# Patient Record
Sex: MALE | Race: WHITE | NOT HISPANIC OR LATINO | Employment: FULL TIME | URBAN - METROPOLITAN AREA
[De-identification: names, ages, dates, MRNs, and addresses within clinical notes are randomized per-mention and may not be internally consistent; named-entity substitution may affect disease eponyms.]

---

## 2017-05-17 ENCOUNTER — NON-PROVIDER VISIT (OUTPATIENT)
Dept: URGENT CARE | Facility: PHYSICIAN GROUP | Age: 47
End: 2017-05-17

## 2017-09-20 ENCOUNTER — OFFICE VISIT (OUTPATIENT)
Dept: CARDIOLOGY | Facility: MEDICAL CENTER | Age: 47
End: 2017-09-20
Payer: COMMERCIAL

## 2017-09-20 VITALS
HEIGHT: 70 IN | SYSTOLIC BLOOD PRESSURE: 130 MMHG | DIASTOLIC BLOOD PRESSURE: 82 MMHG | OXYGEN SATURATION: 97 % | WEIGHT: 186 LBS | BODY MASS INDEX: 26.63 KG/M2 | HEART RATE: 60 BPM

## 2017-09-20 DIAGNOSIS — R07.89 OTHER CHEST PAIN: ICD-10-CM

## 2017-09-20 DIAGNOSIS — Z95.5 STENTED CORONARY ARTERY: ICD-10-CM

## 2017-09-20 DIAGNOSIS — Z72.0 TOBACCO ABUSE: ICD-10-CM

## 2017-09-20 DIAGNOSIS — I25.10 CORONARY ARTERY DISEASE INVOLVING NATIVE CORONARY ARTERY OF NATIVE HEART WITHOUT ANGINA PECTORIS: ICD-10-CM

## 2017-09-20 PROCEDURE — 99214 OFFICE O/P EST MOD 30 MIN: CPT | Performed by: INTERNAL MEDICINE

## 2017-09-20 ASSESSMENT — ENCOUNTER SYMPTOMS
GASTROINTESTINAL NEGATIVE: 1
WEIGHT LOSS: 0
DOUBLE VISION: 0
DIZZINESS: 0
CLAUDICATION: 0
RESPIRATORY NEGATIVE: 1
SHORTNESS OF BREATH: 0
CONSTITUTIONAL NEGATIVE: 1
MUSCULOSKELETAL NEGATIVE: 1
NEUROLOGICAL NEGATIVE: 1
NAUSEA: 0
PALPITATIONS: 0
BLURRED VISION: 0
FOCAL WEAKNESS: 0
DEPRESSION: 0
ABDOMINAL PAIN: 0
FEVER: 0
EYES NEGATIVE: 1
HEADACHES: 0
VOMITING: 0
BRUISES/BLEEDS EASILY: 0
WEAKNESS: 0
NERVOUS/ANXIOUS: 1
COUGH: 0
CHILLS: 0
MYALGIAS: 0

## 2017-09-20 NOTE — PROGRESS NOTES
Subjective:   Justin Corbett is a 47 y.o. male who presents today for annual follow up of chest pain.    Since the patient's last visit on 01/13/15, he has continued to experience chest pain. He denies chest shortness of breath, palpitations, nausea/vomiting or diaphoresis. He has history of self described coronary artery disease with prior stent placements x 3 by Dr. Coleman at [facility] approximately a year ago, though, there are no records of this procedure. He moved to [place] for 2 and half year. He states he has suffered another myocardial infarction on 001/20/17 treated with another stent. 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    Past Medical History:   Diagnosis Date   • MI (myocardial infarction) (CMS-[facility]) 2014   • CAD (coronary artery disease)    • Stented coronary artery     3 stents     Past Surgical History:   Procedure Laterality Date   • ANGIOPLASTY       Family History   Problem Relation Age of Onset   • Heart Attack Father      History   Smoking Status   • Current Every Day Smoker   Smokeless Tobacco   • Never Used     Allergies   Allergen Reactions   • Pcn [Penicillins]      Medications reviewed.    Outpatient Encounter Prescriptions as of 9/20/2017   Medication Sig Dispense Refill   • NITROGLYCERIN SL Place  under tongue.     • [DISCONTINUED] lorazepam (ATIVAN) 1 MG TABS Take 1 Tab by mouth every 8 hours as needed for Anxiety (or muscle spasm). 20 Tab 0   • [DISCONTINUED] oxycodone-acetaminophen (PERCOCET) 5-325 MG TABS Take 1 Tab by mouth every four hours as needed. 15 Tab 0   • [DISCONTINUED] oxycodone-acetaminophen (PERCOCET) 7.5-325 MG per tablet Take 1 Tab by mouth every 6 hours as needed for Moderate Pain or Severe Pain. 40 Tab 0   • [DISCONTINUED] lorazepam (ATIVAN) 1 MG TABS Take 1 Tab by mouth every 6 hours as needed for Anxiety. 40 Tab 0   • [DISCONTINUED] metoprolol SR (TOPROL XL) 25 MG TB24 Take 0.5 Tabs by mouth every day. 30 Tab 11   • [DISCONTINUED] senna-docusate (PERICOLACE OR  "SENOKOT S) 8.6-50 MG TABS Take 1 Tab by mouth every day. 30 Tab 0   • [DISCONTINUED] clopidogrel (PLAVIX) 75 MG TABS Take 1 Tab by mouth every day. 30 Tab 11   • [DISCONTINUED] lisinopril (PRINIVIL) 10 MG TABS Take 1 Tab by mouth every day. 30 Tab 11   • [DISCONTINUED] aspirin (ASA) 81 MG CHEW chewable tablet Take 1 Tab by mouth every day. 100 Tab 2     No facility-administered encounter medications on file as of 9/20/2017.      Review of Systems   Constitutional: Negative.  Negative for chills, fever, malaise/fatigue and weight loss.   HENT: Negative.  Negative for hearing loss.    Eyes: Negative.  Negative for blurred vision and double vision.   Respiratory: Negative.  Negative for cough and shortness of breath.    Cardiovascular: Positive for chest pain. Negative for palpitations, claudication and leg swelling.   Gastrointestinal: Negative.  Negative for abdominal pain, nausea and vomiting.   Genitourinary: Negative.  Negative for dysuria and urgency.   Musculoskeletal: Negative.  Negative for joint pain and myalgias.   Skin: Negative.  Negative for itching and rash.   Neurological: Negative.  Negative for dizziness, focal weakness, weakness and headaches.   Endo/Heme/Allergies: Negative.  Does not bruise/bleed easily.   Psychiatric/Behavioral: Negative for depression. The patient is nervous/anxious.         Objective:   /82   Pulse 60   Ht 1.778 m (5' 10\")   Wt 84.4 kg (186 lb)   SpO2 97%   BMI 26.69 kg/m²     Physical Exam   Constitutional: He is oriented to person, place, and time. He appears well-developed and well-nourished.   HENT:   Head: Normocephalic and atraumatic.   Eyes: Conjunctivae are normal. Pupils are equal, round, and reactive to light.   Neck: Normal range of motion. Neck supple.   Cardiovascular: Normal rate and regular rhythm.    Pulmonary/Chest: Effort normal and breath sounds normal.   Abdominal: Soft. Bowel sounds are normal.   Musculoskeletal: Normal range of motion. He " exhibits no edema.   Neurological: He is alert and oriented to person, place, and time.   Skin: Skin is warm and dry.   Psychiatric: He has a normal mood and affect.     CARDIAC STUDIES/PROCEDURES:     EKG performed on (01/13/15) and reviewed: EKG shows normal sinus rhythm with diffuse J-point elevation.    Assessment:     1. Other chest pain     2. Coronary artery disease involving native coronary artery of native heart without angina pectoris     3. Stented coronary artery     4. Tobacco abuse         Medical Decision Making:  Today's Assessment / Status / Plan:     1. Chest pain with coronary artery disease with prior stent placement: He is clinically doing well. We will obtain records from [place]. We will start aspirin 81 mg QD.  2. Chronic tobacco use: Smoking cessation recommended.    We will follow up in three months.

## 2017-09-26 ENCOUNTER — HOSPITAL ENCOUNTER (OUTPATIENT)
Dept: LAB | Facility: MEDICAL CENTER | Age: 47
End: 2017-09-26
Attending: INTERNAL MEDICINE
Payer: COMMERCIAL

## 2017-09-26 DIAGNOSIS — I25.10 CORONARY ARTERY DISEASE INVOLVING NATIVE CORONARY ARTERY OF NATIVE HEART WITHOUT ANGINA PECTORIS: ICD-10-CM

## 2017-09-26 LAB
ALBUMIN SERPL BCP-MCNC: 4.1 G/DL (ref 3.2–4.9)
ALBUMIN/GLOB SERPL: 1.8 G/DL
ALP SERPL-CCNC: 48 U/L (ref 30–99)
ALT SERPL-CCNC: 41 U/L (ref 2–50)
ANION GAP SERPL CALC-SCNC: 9 MMOL/L (ref 0–11.9)
AST SERPL-CCNC: 36 U/L (ref 12–45)
BILIRUB SERPL-MCNC: 0.9 MG/DL (ref 0.1–1.5)
BUN SERPL-MCNC: 17 MG/DL (ref 8–22)
CALCIUM SERPL-MCNC: 9.6 MG/DL (ref 8.5–10.5)
CHLORIDE SERPL-SCNC: 106 MMOL/L (ref 96–112)
CHOLEST SERPL-MCNC: 172 MG/DL (ref 100–199)
CO2 SERPL-SCNC: 24 MMOL/L (ref 20–33)
CREAT SERPL-MCNC: 1.14 MG/DL (ref 0.5–1.4)
GFR SERPL CREATININE-BSD FRML MDRD: >60 ML/MIN/1.73 M 2
GLOBULIN SER CALC-MCNC: 2.3 G/DL (ref 1.9–3.5)
GLUCOSE SERPL-MCNC: 70 MG/DL (ref 65–99)
HDLC SERPL-MCNC: 33 MG/DL
LDLC SERPL CALC-MCNC: 123 MG/DL
POTASSIUM SERPL-SCNC: 3.5 MMOL/L (ref 3.6–5.5)
PROT SERPL-MCNC: 6.4 G/DL (ref 6–8.2)
SODIUM SERPL-SCNC: 139 MMOL/L (ref 135–145)
TRIGL SERPL-MCNC: 80 MG/DL (ref 0–149)

## 2017-09-26 PROCEDURE — 80061 LIPID PANEL: CPT

## 2017-09-26 PROCEDURE — 80053 COMPREHEN METABOLIC PANEL: CPT

## 2017-09-26 PROCEDURE — 36415 COLL VENOUS BLD VENIPUNCTURE: CPT

## 2017-09-28 ENCOUNTER — TELEPHONE (OUTPATIENT)
Dept: CARDIOLOGY | Facility: MEDICAL CENTER | Age: 47
End: 2017-09-28

## 2017-09-28 DIAGNOSIS — E78.5 OTHER AND UNSPECIFIED HYPERLIPIDEMIA: ICD-10-CM

## 2017-09-28 DIAGNOSIS — I25.10 CORONARY ARTERY DISEASE INVOLVING NATIVE CORONARY ARTERY OF NATIVE HEART WITHOUT ANGINA PECTORIS: ICD-10-CM

## 2017-09-28 NOTE — TELEPHONE ENCOUNTER
S/w pt about Lipid and CMP results. He reports that he exercises everyday and eats healthy. At this time, he does not want to start Simvastatin 20mg. He would like to recheck lipid prior to FU with Dr. Diaz on 12/29.    Lipid Panel ordered per pt request.     NANETTE MALLOY  ----- Message -----  From: FATMATA Burton.  Sent: 9/27/2017   1:38 PM  To: Azul Blevins R.N.    Cholesterol not ideal for CAD. Recommend low dose statin. LDL goal <70. Repeat CMp and lipid in 6 weeks after start of med. CMP showed mildly low K-eat high K foods for a few days. Start simvastatin 20 mg QPM . SC

## 2017-09-29 ENCOUNTER — HOSPITAL ENCOUNTER (OUTPATIENT)
Dept: CARDIOLOGY | Facility: MEDICAL CENTER | Age: 47
End: 2017-09-29
Attending: INTERNAL MEDICINE
Payer: COMMERCIAL

## 2017-09-29 DIAGNOSIS — Z95.5 STENTED CORONARY ARTERY: ICD-10-CM

## 2017-09-29 DIAGNOSIS — I25.10 CORONARY ARTERY DISEASE INVOLVING NATIVE CORONARY ARTERY OF NATIVE HEART WITHOUT ANGINA PECTORIS: ICD-10-CM

## 2017-09-29 DIAGNOSIS — R07.89 OTHER CHEST PAIN: ICD-10-CM

## 2017-09-29 PROCEDURE — 93306 TTE W/DOPPLER COMPLETE: CPT

## 2017-10-02 LAB
LV EJECT FRACT  99904: 60
LV EJECT FRACT MOD 2C 99903: 57.24
LV EJECT FRACT MOD 4C 99902: 62.53
LV EJECT FRACT MOD BP 99901: 60.73

## 2017-10-03 ENCOUNTER — TELEPHONE (OUTPATIENT)
Dept: CARDIOLOGY | Facility: MEDICAL CENTER | Age: 47
End: 2017-10-03

## 2017-10-03 NOTE — TELEPHONE ENCOUNTER
L/m notifying pt of Echo results. Educated pt to please call back if any questions. FU with MICHELLE 12/29    ----- Message -----  From: Suresh Diaz M.D.  Sent: 10/2/2017  12:24 PM  To: Azul Blevins R.N.    Please call with unremarkable study, echocardiogram.    Thanks.  MICHELLE